# Patient Record
Sex: MALE | Race: WHITE | URBAN - METROPOLITAN AREA
[De-identification: names, ages, dates, MRNs, and addresses within clinical notes are randomized per-mention and may not be internally consistent; named-entity substitution may affect disease eponyms.]

---

## 2021-09-10 ENCOUNTER — OFFICE VISIT (OUTPATIENT)
Dept: URGENT CARE | Facility: URGENT CARE | Age: 26
End: 2021-09-10
Payer: COMMERCIAL

## 2021-09-10 VITALS
TEMPERATURE: 98.1 F | HEART RATE: 72 BPM | WEIGHT: 235.25 LBS | OXYGEN SATURATION: 96 % | SYSTOLIC BLOOD PRESSURE: 132 MMHG | DIASTOLIC BLOOD PRESSURE: 82 MMHG

## 2021-09-10 DIAGNOSIS — L03.119 CELLULITIS OF LOWER EXTREMITY, UNSPECIFIED LATERALITY: ICD-10-CM

## 2021-09-10 DIAGNOSIS — L50.9 HIVES: ICD-10-CM

## 2021-09-10 DIAGNOSIS — L30.2 ID REACTION: ICD-10-CM

## 2021-09-10 DIAGNOSIS — L25.9 CONTACT DERMATITIS, UNSPECIFIED CONTACT DERMATITIS TYPE, UNSPECIFIED TRIGGER: Primary | ICD-10-CM

## 2021-09-10 PROCEDURE — 99204 OFFICE O/P NEW MOD 45 MIN: CPT | Performed by: PHYSICIAN ASSISTANT

## 2021-09-10 RX ORDER — PREDNISONE 20 MG/1
TABLET ORAL
Qty: 20 TABLET | Refills: 0 | Status: SHIPPED | OUTPATIENT
Start: 2021-09-10

## 2021-09-10 RX ORDER — CEPHALEXIN 500 MG/1
500 CAPSULE ORAL 3 TIMES DAILY
Qty: 21 CAPSULE | Refills: 0 | Status: SHIPPED | OUTPATIENT
Start: 2021-09-10 | End: 2021-09-17

## 2021-09-10 RX ORDER — MUPIROCIN 20 MG/G
OINTMENT TOPICAL 2 TIMES DAILY
Qty: 30 G | Refills: 0 | Status: SHIPPED | OUTPATIENT
Start: 2021-09-10 | End: 2021-09-20

## 2021-09-10 NOTE — PROGRESS NOTES
Chief Complaint   Patient presents with     Derm Problem     Rash started on the ankles on 9/5/21, spreading to the arms, chest and sides, oozing on the ankles, slight relief with Benadryl         Medical Decision Making:    Differential Diagnosis:  Rash: Cellulitis  Contact dermatitis  Drug eruption  Folliculitis  Histamine reaction  Viral exanthem       ASSESSMENT:    ICD-10-CM    1. Contact dermatitis, unspecified contact dermatitis type, unspecified trigger  L25.9 cephALEXin (KEFLEX) 500 MG capsule     predniSONE (DELTASONE) 20 MG tablet     diphenhydrAMINE (BENADRYL) 25 MG tablet     mupirocin (BACTROBAN) 2 % external ointment     Adult Dermatology Referral   2. Cellulitis of lower extremity, unspecified laterality  L03.119    3. Hives  L50.9 cephALEXin (KEFLEX) 500 MG capsule     predniSONE (DELTASONE) 20 MG tablet     diphenhydrAMINE (BENADRYL) 25 MG tablet     mupirocin (BACTROBAN) 2 % external ointment     Adult Dermatology Referral   4. Id reaction  L30.2 cephALEXin (KEFLEX) 500 MG capsule     predniSONE (DELTASONE) 20 MG tablet     diphenhydrAMINE (BENADRYL) 25 MG tablet     mupirocin (BACTROBAN) 2 % external ointment     Adult Dermatology Referral         PLAN: Ankle rash looks like some sort of contact dermatitis with secondary infection.  The rest of the rash especially on the back looks like secondary hives or id reaction.  Oral antibiotic Keflex.  Chance of cross-reactivity with amoxicillin is minimal.  Amoxicillin reaction was rash in 2008 at the age of 13.  Topical Bactroban to ankle areas.  Keep covered and change dressing daily.  Use Telfa, 4 x 4 and Ace wrap to keep it in place.  Prednisone for itching and inflammation.  Benadryl 50 mg 2 times daily.  If too much drowsiness then just use at bedtime.  Recheck Monday if he goes do not look any better.  Referral to dermatology.    Advised about symptoms which might herald more serious problems.        Ila Rao PA-C          SUBJECTIVE:    26-year-old male presents for rash on his ankles that started 9/5/2021.  Then new rashes on his arms chest and sides.  The ankles are now oozing.  Slightly rate relieved with Benadryl.  No fever.  The day prior to the onset he had been walking his dogs in the past but did not veer off into the weeds.  He denies any tick bites or insect bites.  No new over-the-counter or prescription medications.  No new topicals, laundry detergents.  Has never had anything like this before in the past.  No hot tubbing.       Allergies   Allergen Reactions     Sulfa Drugs      Amoxicillin Rash       No past medical history on file.    No current outpatient medications on file prior to visit.  No current facility-administered medications on file prior to visit.      Social History     Tobacco Use     Smoking status: Never Smoker     Smokeless tobacco: Never Used   Substance Use Topics     Alcohol use: None     Drug use: None       ROS:  General: negative for fever  SKIN: + as above    Physcial Exam:  /82 (BP Location: Left arm, Patient Position: Sitting, Cuff Size: Adult Regular)   Pulse 72   Temp 98.1  F (36.7  C) (Oral)   Wt 106.7 kg (235 lb 4 oz)   SpO2 96%     GENERAL: alert, no acute distress  EYES: conjunctival clear  RESP: Regular breathing rate.  Lungs are clear to auscultation  Heart regular rate and rhythm without murmur  NEURO: awake .  SKIN: Anterior ankles are with extensive open wheezing blisters.  Some yellow crusting.  Some erythema.  Diffuse scattered pink to red papules/macules on shins, thighs, arms, chest and back.  Some are raised, especially on the back.  Ears-TMs translucent bilaterally  Pharynx-no erythema  No tongue, lip, throat swelling noted.  No facial swelling.  Sensation lower extremities intact.  Neck supple without lymphadenopathy.  Ila Rao PA-C